# Patient Record
Sex: MALE | Race: WHITE | NOT HISPANIC OR LATINO
[De-identification: names, ages, dates, MRNs, and addresses within clinical notes are randomized per-mention and may not be internally consistent; named-entity substitution may affect disease eponyms.]

---

## 2019-04-24 ENCOUNTER — APPOINTMENT (OUTPATIENT)
Dept: ORTHOPEDIC SURGERY | Facility: CLINIC | Age: 24
End: 2019-04-24
Payer: COMMERCIAL

## 2019-04-24 DIAGNOSIS — M25.879 OTHER SPECIFIED JOINT DISORDERS, UNSPECIFIED ANKLE AND FOOT: ICD-10-CM

## 2019-04-24 DIAGNOSIS — M94.269 CHONDROMALACIA, UNSPECIFIED KNEE: ICD-10-CM

## 2019-04-24 PROBLEM — Z00.00 ENCOUNTER FOR PREVENTIVE HEALTH EXAMINATION: Status: ACTIVE | Noted: 2019-04-24

## 2019-04-24 PROCEDURE — 73562 X-RAY EXAM OF KNEE 3: CPT | Mod: RT

## 2019-04-24 PROCEDURE — 99204 OFFICE O/P NEW MOD 45 MIN: CPT

## 2019-04-24 PROCEDURE — 73610 X-RAY EXAM OF ANKLE: CPT | Mod: RT

## 2019-04-25 NOTE — HISTORY OF PRESENT ILLNESS
[de-identified] : Patient is a new patient complaining of pain in his right knee over the last month or so as well as similar discomfort over several months to his lateral right ankle. Denies any history of trauma but notices some discomfort after playing activities to complete basketball at his knee. Denies any instability\par

## 2019-04-25 NOTE — ASSESSMENT
[FreeTextEntry1] : Discussed at length with patient exam of the knee as well as ankle. Recommend arch supports for 3 weeks for his ankle given his lateral ankle impingement and mild to many toes sign consistent with overpronation. Recommend home exercises for the knee 5-6 weeks. If no improvement on either is to contact the office for further discussion and evaluation

## 2019-04-25 NOTE — PHYSICAL EXAM
[de-identified] : Right knee\par \par Constitutional: \par The patient is healthy-appearing and in no apparent distress. \par \par Gait:\par The patient ambulates with a normal gait and no limp.\par \par Cardiovascular System: \par There is capillary refill less than 2 seconds. \par \par \par Skin: \par There is no skin abnormalities.\par \par Right Knee: \par Bony Palpation: \par There is no tenderness of the medial or lateral joint line, the medial of lateral femoral chondyle, tibial tubercle, superior or inferior patella.\par There is tenderness to the medial and lateral patellar facets.\par \par Soft Tissue Palpation: \par There is no tenderness of the medial and lateral retinaculum, quadriceps tendon, patella tendon, ITB or pes anserine.\par \par Active Range of Motion: \par There is full range of motion at the knee actively and passively. \par \par Special Tests: \par There is a negative Apley and Steinmanns.  There is a negative Lachman, Anterior Drawer, and Posterior Drawer.  There is no varus or valgus laxity.\par \par Strength: \par There is 4/5 hip flexion and 5/5 knee flexion and extension.  \par \par Psychiatric: \par The patient demonstrates a normal mood and affect and is active and alert\par Alignment:\par There is external tibial rotation and femoral anteversion.\par Right ankle examination:\par \par Constitutional: \par The patient is healthy-appearing and in no apparent distress. \par \par Gait and Station: \par The patient ambulates with a normal gait. \par \par Cardiovascular System: \par There is capillary refill less than 2 seconds. \par \par Skin: \par There is no skin abnormalities of ankle.\par \par Ankles and Feet: \par Inspection: \par There is no erythema, induration, warmth, or deformity. \par \par Bony Palpation: \par There is no tenderness of the calcaneal tuberosity, the metatarsals, the tarsometatarsal joints, the navicular tuberosity, the dome of talus, the head of talus, the inferior tibiofibular joint, or the achilles tendon insertion. \par \par Soft Tissue Palpation: no tenderness of the tibialis posterior, the tibialis anterior, the plantar fascia, the achilles tendon, the peroneus longus and brevis, the extensor hallucis longus, the sinus tarsi, the lateral anterior talofibular ligament, the posterior talofibular ligament, the peroneal retinaculum, or the deltoid ligament.   There is tenderness of the anterior talofibular ligament and the calcaneofibular ligament. \par \par Active Range of Motion: \par There is normal great toe flexion normal and extension normal and dorsiflexion normal, plantar flexion normal, inversion normal, and eversion normal. \par \par Stability: \par There anterior drawer is negative. \par \par Strength: \par There is extensor digitorum longus (5/5) and brevis (5/5); extensor hallucis longus (5/5); peroneus longus (5/5) and brevis (5/5); and posterior tibialis (5/5), tibialis anterior (5/5), and gastrocnemius (5/5). \par \par Neurological System: \par There is normal sensation to light touch at the ankle and foot. \par \par \par  [de-identified] : X-ray right knee. 3 views demonstrate no significant bony abnormality arthritis or fracture\par X-ray right ankle. 3 views demonstrate no fracture or significant bony abnormality\par \par

## 2022-01-11 ENCOUNTER — TRANSCRIPTION ENCOUNTER (OUTPATIENT)
Age: 27
End: 2022-01-11

## 2022-03-12 ENCOUNTER — TRANSCRIPTION ENCOUNTER (OUTPATIENT)
Age: 27
End: 2022-03-12

## 2022-05-02 ENCOUNTER — NON-APPOINTMENT (OUTPATIENT)
Age: 27
End: 2022-05-02